# Patient Record
Sex: MALE | Race: WHITE | NOT HISPANIC OR LATINO | Employment: UNEMPLOYED | URBAN - NONMETROPOLITAN AREA
[De-identification: names, ages, dates, MRNs, and addresses within clinical notes are randomized per-mention and may not be internally consistent; named-entity substitution may affect disease eponyms.]

---

## 2022-11-04 NOTE — PROGRESS NOTES
Patient: Mike Silva    YOB: 1975    Date: 11/10/2022    Primary Care Provider: Mars Ku APRN    Chief Complaint   Patient presents with   • Hernia     Consult-ventral hernia       SUBJECTIVE:    History of present illness: Patient states that on a prejob physical examination he was found to have a possible hernia in the upper abdomen.  Patient had not noticed anything previous to this.  No pain.  No previous abdominal surgery.    The following portions of the patient's history were reviewed and updated as appropriate: allergies, current medications, past family history, past medical history, past social history, past surgical history and problem list.    Review of Systems   Constitutional: Negative for activity change, chills, fever and unexpected weight change.   HENT: Negative for hearing loss, trouble swallowing and voice change.    Eyes: Negative for visual disturbance.   Respiratory: Negative for apnea, cough, chest tightness, shortness of breath and wheezing.    Cardiovascular: Negative for chest pain, palpitations and leg swelling.   Gastrointestinal: Negative for abdominal distention, abdominal pain, anal bleeding, blood in stool, constipation, diarrhea, nausea, rectal pain and vomiting.   Endocrine: Negative for cold intolerance and heat intolerance.   Genitourinary: Negative for difficulty urinating, dysuria and flank pain.   Musculoskeletal: Negative for back pain and gait problem.   Skin: Negative for color change, rash and wound.   Neurological: Negative for dizziness, syncope, speech difficulty, weakness, light-headedness, numbness and headaches.   Hematological: Negative for adenopathy. Does not bruise/bleed easily.   Psychiatric/Behavioral: Negative for confusion. The patient is not nervous/anxious.        History:  History reviewed. No pertinent past medical history.    History reviewed. No pertinent surgical history.    History reviewed. No pertinent family  "history.    Social History     Tobacco Use   • Smoking status: Former     Packs/day: 1.50     Types: Cigarettes     Quit date:      Years since quittin.8   Substance Use Topics   • Alcohol use: Yes       Allergies:  Allergies   Allergen Reactions   • Peanut-Containing Drug Products Anaphylaxis       Medications:    Current Outpatient Medications:   •  atorvastatin (LIPITOR) 40 MG tablet, , Disp: , Rfl:   •  losartan (COZAAR) 100 MG tablet, , Disp: , Rfl:     OBJECTIVE:    Vital Signs:   Vitals:    11/10/22 1435   BP: 116/70   Pulse: 114   Temp: 98.2 °F (36.8 °C)   SpO2: 99%   Weight: 99.8 kg (220 lb)   Height: 175.3 cm (69\")       Physical Exam:   General Appearance:    Alert, cooperative, in no acute distress   Head:    Normocephalic, without obvious abnormality, atraumatic   Eyes:            Normal.  No scleral icterus.  PERRLA    Lungs:     Clear to auscultation,respirations regular, even and                  unlabored    Heart:   Slightly tachycardic.  Patient states that he is quite nervous today regarding this potential hernia.   Abdomen:     Normal bowel sounds, no masses, no organomegaly, soft        non-tender, non-distended, no guarding, likely upper abdominal wall diastases without obvious hernia   Extremities:   Moves all extremities well, no edema, no cyanosis, no             redness   Skin:   No bleeding, bruising or rash   Neurologic:   Normal without gross deficits.   Psychiatric: No evidence of depression or anxiety        Results Review:   None    Review of Systems was reviewed and confirmed as accurate as documented by the MA.    ASSESSMENT/PLAN:    1. Rectus diastasis        Patient with an upper abdominal wall rectus diastases.  I also performed an informal ultrasound today to confirm this.  No ventral hernia appreciated.  I explained to him that this is essentially cosmetic.  No lifting restrictions.  Follow-up as needed.      I also explained to him that I recommend a screening " colonoscopy.  He will call me an appropriate time to schedule this as he hopefully will be starting a new job soon.    Electronically signed by Bentley Conte MD  11/10/22

## 2022-11-10 ENCOUNTER — OFFICE VISIT (OUTPATIENT)
Dept: SURGERY | Facility: CLINIC | Age: 47
End: 2022-11-10

## 2022-11-10 VITALS
OXYGEN SATURATION: 99 % | DIASTOLIC BLOOD PRESSURE: 70 MMHG | HEIGHT: 69 IN | SYSTOLIC BLOOD PRESSURE: 116 MMHG | TEMPERATURE: 98.2 F | BODY MASS INDEX: 32.58 KG/M2 | HEART RATE: 114 BPM | WEIGHT: 220 LBS

## 2022-11-10 DIAGNOSIS — M62.08 RECTUS DIASTASIS: Primary | ICD-10-CM

## 2022-11-10 PROCEDURE — 99203 OFFICE O/P NEW LOW 30 MIN: CPT | Performed by: SURGERY

## 2022-11-10 RX ORDER — ATORVASTATIN CALCIUM 40 MG/1
TABLET, FILM COATED ORAL
COMMUNITY
Start: 2022-11-09

## 2022-11-10 RX ORDER — LOSARTAN POTASSIUM 100 MG/1
TABLET ORAL
COMMUNITY
Start: 2022-11-09